# Patient Record
Sex: FEMALE | Race: WHITE | NOT HISPANIC OR LATINO | Employment: PART TIME | URBAN - NONMETROPOLITAN AREA
[De-identification: names, ages, dates, MRNs, and addresses within clinical notes are randomized per-mention and may not be internally consistent; named-entity substitution may affect disease eponyms.]

---
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
## 2024-01-05 DIAGNOSIS — N92.0 MENORRHAGIA WITH REGULAR CYCLE: ICD-10-CM

## 2024-01-09 RX ORDER — DROSPIRENONE AND ETHINYL ESTRADIOL 0.02-3(28)
1 KIT ORAL DAILY
Qty: 84 TABLET | Refills: 2 | Status: SHIPPED | OUTPATIENT
Start: 2024-01-09 | End: 2024-03-25

## 2024-01-09 NOTE — TELEPHONE ENCOUNTER
[facility] sent Rx request for the following:      Requested Prescriptions   Pending Prescriptions Disp Refills    drospirenone-ethinyl estradiol (JUAQUIN) 3-0.02 MG tablet [Pharmacy Med Name: DROSPIRENONE/ETHY EST 3/0.02MG T 28] 84 tablet 3     Sig: TAKE 1 TABLET BY MOUTH DAILY   Last Prescription Date:   2/27/23  Last Fill Qty/Refills:         84, R-3    Last Office Visit:              12/1/23   Future Office visit:           None    Prescription approved per [facility] Refill Protocol.    Shamika Renee RN .............. 1/9/2024  3:03 PM

## 2024-03-23 SDOH — HEALTH STABILITY: PHYSICAL HEALTH: ON AVERAGE, HOW MANY DAYS PER WEEK DO YOU ENGAGE IN MODERATE TO STRENUOUS EXERCISE (LIKE A BRISK WALK)?: 5 DAYS

## 2024-03-23 SDOH — HEALTH STABILITY: PHYSICAL HEALTH: ON AVERAGE, HOW MANY MINUTES DO YOU ENGAGE IN EXERCISE AT THIS LEVEL?: 90 MIN

## 2024-03-23 ASSESSMENT — SOCIAL DETERMINANTS OF HEALTH (SDOH): HOW OFTEN DO YOU GET TOGETHER WITH FRIENDS OR RELATIVES?: MORE THAN THREE TIMES A WEEK

## 2024-03-25 ENCOUNTER — OFFICE VISIT (OUTPATIENT)
Dept: FAMILY MEDICINE | Facility: OTHER | Age: 19
End: 2024-03-25
Attending: FAMILY MEDICINE
Payer: COMMERCIAL

## 2024-03-25 VITALS
DIASTOLIC BLOOD PRESSURE: 62 MMHG | WEIGHT: 173.2 LBS | HEART RATE: 74 BPM | BODY MASS INDEX: 27.83 KG/M2 | TEMPERATURE: 98.2 F | HEIGHT: 66 IN | OXYGEN SATURATION: 100 % | SYSTOLIC BLOOD PRESSURE: 100 MMHG | RESPIRATION RATE: 20 BRPM

## 2024-03-25 DIAGNOSIS — Z02.89 HEALTH EXAMINATION OF DEFINED SUBPOPULATION: Primary | ICD-10-CM

## 2024-03-25 DIAGNOSIS — L70.0 ACNE VULGARIS: ICD-10-CM

## 2024-03-25 DIAGNOSIS — E61.1 IRON DEFICIENCY: ICD-10-CM

## 2024-03-25 DIAGNOSIS — N92.0 MENORRHAGIA WITH REGULAR CYCLE: ICD-10-CM

## 2024-03-25 LAB
C TRACH DNA SPEC QL PROBE+SIG AMP: NEGATIVE
N GONORRHOEA DNA SPEC QL NAA+PROBE: NEGATIVE

## 2024-03-25 PROCEDURE — 90471 IMMUNIZATION ADMIN: CPT | Performed by: FAMILY MEDICINE

## 2024-03-25 PROCEDURE — 90480 ADMN SARSCOV2 VAC 1/ONLY CMP: CPT | Performed by: FAMILY MEDICINE

## 2024-03-25 PROCEDURE — 91320 SARSCV2 VAC 30MCG TRS-SUC IM: CPT | Performed by: FAMILY MEDICINE

## 2024-03-25 PROCEDURE — 99395 PREV VISIT EST AGE 18-39: CPT | Mod: 25 | Performed by: FAMILY MEDICINE

## 2024-03-25 PROCEDURE — 90686 IIV4 VACC NO PRSV 0.5 ML IM: CPT | Performed by: FAMILY MEDICINE

## 2024-03-25 PROCEDURE — 87491 CHLMYD TRACH DNA AMP PROBE: CPT | Mod: ZL | Performed by: FAMILY MEDICINE

## 2024-03-25 RX ORDER — DEXTROAMPHETAMINE SACCHARATE, AMPHETAMINE ASPARTATE MONOHYDRATE, DEXTROAMPHETAMINE SULFATE AND AMPHETAMINE SULFATE 3.75; 3.75; 3.75; 3.75 MG/1; MG/1; MG/1; MG/1
15 CAPSULE, EXTENDED RELEASE ORAL DAILY
COMMUNITY
Start: 2024-02-27 | End: 2024-06-03

## 2024-03-25 RX ORDER — TRETINOIN 0.25 MG/G
GEL TOPICAL AT BEDTIME
Qty: 20 G | Refills: 4 | Status: SHIPPED | OUTPATIENT
Start: 2024-03-25

## 2024-03-25 RX ORDER — DROSPIRENONE AND ETHINYL ESTRADIOL 0.02-3(28)
1 KIT ORAL DAILY
Qty: 84 TABLET | Refills: 3 | Status: SHIPPED | OUTPATIENT
Start: 2024-03-25

## 2024-03-25 ASSESSMENT — PAIN SCALES - GENERAL: PAINLEVEL: NO PAIN (0)

## 2024-03-25 NOTE — PROGRESS NOTES
"  SUBJECTIVE:   Paty Maurer is a 18 year old female who presents to clinic today for the following health issues: Physical establish care    Patient arrives here for physical and establish care.  Patient is requesting chlamydia screening.  She reports no risk factors.  She also requesting a refill of her iron.  Reviewed review of the chart shows that not been in iron deficiency.  This was discussed with the patient.  She also gets her mental health care through Dr. Huang in [facility].          Patient Active Problem List    Diagnosis Date Noted    Menorrhagia with regular cycle 12/03/2023     Priority: Medium    Acne vulgaris 12/03/2023     Priority: Medium    Migraine without aura and with status migrainosus, not intractable 12/03/2023     Priority: Medium    Adjustment disorder with mixed anxiety and depressed mood 08/13/2018     Priority: Medium     Francie Azul recommends a good therapist.  If she needs medications, she should start with the usual ssri's recommended for adolescents.  Signed by Sowmya Hickman MD .....8/31/2020 9:18 AM'      Allergic rhinitis 06/18/2012     Priority: Medium    Dermatitis, atopic 03/21/2006     Priority: Medium     Past Medical History:   Diagnosis Date    Accessory nipple     No Comments Provided    Headache     2009,thought to be secondary to environmental allergies    Otitis media     2005    Otitis media     2005,left    Otitis media of both ears     2005,, treated with a 10-day course of Amoxicillin.    Personal history of other diseases of the female genital tract     Born 39 weeks induced vaginal delivery, birth wt. 7 lbs. ? oz. 20 ? in. long, mother on prednisone during pregnancy due to anti-lupus antibodies present        Review of Systems     OBJECTIVE:     /62   Pulse 74   Temp 98.2  F (36.8  C)   Resp 20   Ht 1.676 m (5' 6\")   Wt 78.6 kg (173 lb 3.2 oz)   LMP 03/09/2024   SpO2 100%   BMI 27.96 kg/m    Body mass index is 27.96 kg/m .  Physical " Exam  Constitutional:       Appearance: Normal appearance.   HENT:      Head: Normocephalic and atraumatic.   Cardiovascular:      Rate and Rhythm: Normal rate and regular rhythm.   Pulmonary:      Effort: Pulmonary effort is normal.      Breath sounds: Normal breath sounds.   Abdominal:      General: Abdomen is flat.   Neurological:      Mental Status: She is alert.   Psychiatric:         Mood and Affect: Mood normal.         Thought Content: Thought content normal.         Diagnostic Test Results:  none     ASSESSMENT/PLAN:         (Z02.89) Health examination of defined subpopulation  (primary encounter diagnosis) Chlamydia GC drawn      (E61.1) Iron deficiency  Comment: History of.  Recent labs were satisfactory including a ferritin.  Will discontinue the iron supplement  Plan:     (N92.0) Menorrhagia with regular cycle  Commen patient reports improvement with he has  Plan: drospirenone-ethinyl estradiol (JUAQUIN) 3-0.02 MG         tablet, GC/Chlamydia by PCR            (L70.0) Acne vulgaris  Comment:   Plan: tretinoin (RETIN-A) 0.025 % external gel        Refill      Fabio Martin MD  [facility]

## 2024-03-25 NOTE — NURSING NOTE
Patient here for physical and STD screening. Last eye exam unsure no concerns with vision and last dental exam March 2024. Medication Reconciliation: complete.    Florina Duval LPN  3/25/2024 11:30 AM

## 2024-04-20 ENCOUNTER — OFFICE VISIT (OUTPATIENT)
Dept: FAMILY MEDICINE | Facility: OTHER | Age: 19
End: 2024-04-20
Attending: NURSE PRACTITIONER
Payer: COMMERCIAL

## 2024-04-20 VITALS
HEIGHT: 65 IN | DIASTOLIC BLOOD PRESSURE: 82 MMHG | OXYGEN SATURATION: 97 % | SYSTOLIC BLOOD PRESSURE: 132 MMHG | WEIGHT: 171.1 LBS | TEMPERATURE: 98 F | BODY MASS INDEX: 28.51 KG/M2 | RESPIRATION RATE: 18 BRPM | HEART RATE: 112 BPM

## 2024-04-20 DIAGNOSIS — R35.0 URINARY FREQUENCY: ICD-10-CM

## 2024-04-20 DIAGNOSIS — G44.52 NEW PERSISTENT DAILY HEADACHE: ICD-10-CM

## 2024-04-20 DIAGNOSIS — R42 DIZZINESS: ICD-10-CM

## 2024-04-20 DIAGNOSIS — R30.0 DYSURIA: Primary | ICD-10-CM

## 2024-04-20 LAB
ALBUMIN UR-MCNC: NEGATIVE MG/DL
APPEARANCE UR: CLEAR
BACTERIA #/AREA URNS HPF: ABNORMAL /HPF
BILIRUB UR QL STRIP: NEGATIVE
COLOR UR AUTO: YELLOW
GLUCOSE UR STRIP-MCNC: NEGATIVE MG/DL
HGB UR QL STRIP: NEGATIVE
KETONES UR STRIP-MCNC: NEGATIVE MG/DL
LEUKOCYTE ESTERASE UR QL STRIP: NEGATIVE
MUCOUS THREADS #/AREA URNS LPF: PRESENT /LPF
NITRATE UR QL: NEGATIVE
PH UR STRIP: 6.5 [PH] (ref 5–9)
RBC URINE: <1 /HPF
SP GR UR STRIP: 1 (ref 1–1.03)
SQUAMOUS EPITHELIAL: 5 /HPF
UROBILINOGEN UR STRIP-MCNC: NORMAL MG/DL
WBC URINE: 10 /HPF

## 2024-04-20 PROCEDURE — 81001 URINALYSIS AUTO W/SCOPE: CPT | Mod: ZL

## 2024-04-20 PROCEDURE — 93000 ELECTROCARDIOGRAM COMPLETE: CPT | Performed by: INTERNAL MEDICINE

## 2024-04-20 PROCEDURE — 99214 OFFICE O/P EST MOD 30 MIN: CPT | Performed by: NURSE PRACTITIONER

## 2024-04-20 PROCEDURE — 87086 URINE CULTURE/COLONY COUNT: CPT | Mod: ZL | Performed by: NURSE PRACTITIONER

## 2024-04-20 PROCEDURE — 96372 THER/PROPH/DIAG INJ SC/IM: CPT

## 2024-04-20 PROCEDURE — 250N000011 HC RX IP 250 OP 636

## 2024-04-20 RX ORDER — ONDANSETRON 4 MG/1
4 TABLET, ORALLY DISINTEGRATING ORAL ONCE
Status: COMPLETED | OUTPATIENT
Start: 2024-04-20 | End: 2024-04-20

## 2024-04-20 RX ORDER — KETOROLAC TROMETHAMINE 30 MG/ML
30 INJECTION, SOLUTION INTRAMUSCULAR; INTRAVENOUS ONCE
Status: COMPLETED | OUTPATIENT
Start: 2024-04-20 | End: 2024-04-20

## 2024-04-20 RX ADMIN — KETOROLAC TROMETHAMINE 30 MG: 30 INJECTION, SOLUTION INTRAMUSCULAR at 14:17

## 2024-04-20 RX ADMIN — ONDANSETRON 4 MG: 4 TABLET, ORALLY DISINTEGRATING ORAL at 14:17

## 2024-04-20 ASSESSMENT — PAIN SCALES - GENERAL: PAINLEVEL: NO PAIN (0)

## 2024-04-20 NOTE — NURSING NOTE
"Chief Complaint   Patient presents with    UTI       Initial /82 (BP Location: Left arm, Patient Position: Chair)   Pulse 112   Temp 98  F (36.7  C) (Temporal)   Resp 18   Ht 1.651 m (5' 5\")   Wt 77.6 kg (171 lb 1.6 oz)   LMP 03/09/2024   SpO2 97%   Breastfeeding No   BMI 28.47 kg/m   Estimated body mass index is 28.47 kg/m  as calculated from the following:    Height as of this encounter: 1.651 m (5' 5\").    Weight as of this encounter: 77.6 kg (171 lb 1.6 oz).    FOOD SECURITY SCREENING QUESTIONS:    The next two questions are to help us understand your food security.  If you are feeling you need any assistance in this area, we have resources available to support you today.    Hunger Vital Signs:  Within the past 12 months we worried whether our food would run out before we got money to buy more. Never  Within the past 12 months the food we bought just didn't last and we didn't have money to get more. Never    Medication Reconciliation: Complete.       Rose Mary Beal LPN on 4/20/2024 at 1:41 PM     "

## 2024-04-20 NOTE — NURSING NOTE
Clinic Administered Medication Documentation        Patient was given Zofran and Toradol. Prior to medication administration, verified patient's identity using patient s name and date of birth. Please see MAR and medication order for additional information. Patient instructed to remain in clinic for 15 minutes and report any adverse reaction to staff immediately.    Vial/Syringe: Single dose vial. Was entire vial of medication used? Yes  Rose Mary Beal LPN...................4/20/2024   2:08 PM

## 2024-04-20 NOTE — PROGRESS NOTES
"ASSESSMENT/PLAN:     I have reviewed the nursing notes.  I have reviewed the findings, diagnosis, plan and need for follow up with the patient.        1. Dysuria    - UA with Microscopic reflex to Culture    Urinalysis -yellow, clear, negative blood, negative nitrite, negative leukocyte esterase  Urine microscopic - RBC <1, WBC 10, bacteria few, mucus present`    Urine culture pending.  No clinical indications for antibiotics at this time.  Patient had negative gonorrhea and Chlamydia testing on 3/25/2024    Patient with self-reported history of frequent UTIs however on review of chart, urine cultures have not grown out bacterial infection.  Unremarkable urinalysis on 7/14/2023.  Unremarkable urinalysis on 6/16/2023.  Urine culture from 6/8/2023 with mixed urogenital cait.  Urine culture from 3/28/2023 with mixed urogenital cait.  Urine culture from 3/27/2023 with mixed urogenital cait.  Urine culture from 3/3/2023 with no growth.    Encourage fluids and regular bladder emptying  May use over-the-counter Pyridium as needed    Patient reports she has an appointment upcoming next month with urology for further evaluation and management    2. Urinary frequency    - UA with Microscopic reflex to Culture    Patient with history of chronic urinary frequency without infection noted on urine cultures over the past year.    Patient reports she has an appointment upcoming next month with urology for further evaluation and management    3. Dizziness    - Orthostatic blood pressure; Standing  - Orthostatic blood pressure  - EKG 12-lead, tracing only (Same Day)    Patient with acute onset dizziness since last night.  Orthostatic blood pressures were abnormal today:  Laying: /86  P 91  Sitting:  /79  P 101  Standing for 1 minute:  /76  P 64  Standing for 5 minutes:  /86  P 112    EKG completed and reviewed with preliminary result:  \"Sinus rhythm with sinus arrhythmia.  Normal ECG.  When compared to ECG " "from 10/22/2021 incomplete right bundle branch block is no longer present T wave inversion no longer evident in anterior leads.\"    Recommend follow-up with primary care for further evaluation and management.  Patient has appointment scheduled for 5/2/2024    4. New persistent daily headache    - ketorolac (TORADOL) injection 30 mg IM injection x 1 administered in clinic  - ondansetron (ZOFRAN ODT) ODT tab 4 mg x 1 administered in clinic    Patient with new onset of daily headaches for the past 2 weeks pain localized on the left parietal area.  No diagnosed history of migraines.  Patient reports no improvement in symptoms with use of ibuprofen.  Patient reports resolution of headache after administration of Toradol and Zofran in clinic today and reports this is the best she has felt in the past 2 weeks.    Recommend follow-up with primary care for further evaluation and management of headaches.   Patient has appointment scheduled for 5/2/2024            I explained my diagnostic considerations and recommendations to the patient, who voiced understanding and agreement with the treatment plan. All questions were answered. We discussed potential side effects of any prescribed or recommended therapies, as well as expectations for response to treatments.    Shara Hastings NP  [facility] AND [facility]      SUBJECTIVE:   Paty Maurer is a 19 year old female who presents to clinic today for the following health issues:  UTI and dizziness       HPI  Chronic frequent urination.  Dysuria, abdominal cramping, back pain, nausea and dizziness since last night.  No noted cloudy appearance.  No hematuria.  No noted odor.  No fevers, chills, or sweats.  No vomiting.  Mild constipation from iron supplements.  Appetite at baseline.  Drinking fluids fair.  States hx of frequent UTIs.  Reports she is seeing urology next month for chronic urinary frequency and frequent UTIs.  Frequent headaches the past 2 weeks.  Localized " headache pain on left side of head.  Mild blurry vision.  No LOC or head injury.  No light or noise sensitivity.  No neck pain or stiffness.          Past Medical History:   Diagnosis Date    Accessory nipple     No Comments Provided    Headache     2009,thought to be secondary to environmental allergies    Otitis media     2005    Otitis media     2005,left    Otitis media of both ears     2005,, treated with a 10-day course of Amoxicillin.    Personal history of other diseases of the female genital tract     Born 39 weeks induced vaginal delivery, birth wt. 7 lbs. ? oz. 20 ? in. long, mother on prednisone during pregnancy due to anti-lupus antibodies present     Past Surgical History:   Procedure Laterality Date    OTHER SURGICAL HISTORY      19354.0,PAST SURGICAL HISTORY,Born 39 weeks induced vaginal delivery, birth wt. 7 lbs. ? oz. 20 ? in. long, mother on prednisone during pregnancy due to anti-lupus antibodies present ~Supernumerary nipple.  ~2/07/05 Bilateral otitis media, treated with a 10-day course of Amoxicillin.*     Social History     Tobacco Use    Smoking status: Passive Smoke Exposure - Never Smoker    Smokeless tobacco: Never    Tobacco comments:     mom smokes outside   Substance Use Topics    Alcohol use: Never     Alcohol/week: 0.0 standard drinks of alcohol     Current Outpatient Medications   Medication Sig Dispense Refill    amphetamine-dextroamphetamine (ADDERALL XR) 15 MG 24 hr capsule Take 15 mg by mouth daily      drospirenone-ethinyl estradiol (JUAQUIN) 3-0.02 MG tablet Take 1 tablet by mouth daily 84 tablet 3    hydrOXYzine (ATARAX) 10 MG tablet Take 10 mg by mouth every 4 hours as needed      lamoTRIgine (LAMICTAL) 25 MG 24 hr tablet Take 25 mg by mouth daily 06/08/2023 Take 3 tablets once daily  rh/lpn      tretinoin (RETIN-A) 0.025 % external gel Apply topically at bedtime 20 g 4     No Known Allergies      Past medical history, past surgical history, current medications and allergies  "reviewed and accurate to the best of my knowledge.        OBJECTIVE:     /82 (BP Location: Left arm, Patient Position: Chair)   Pulse 112   Temp 98  F (36.7  C) (Temporal)   Resp 18   Ht 1.651 m (5' 5\")   Wt 77.6 kg (171 lb 1.6 oz)   LMP 03/09/2024   SpO2 97%   Breastfeeding No   BMI 28.47 kg/m    Body mass index is 28.47 kg/m .        Physical Exam  General Appearance: Well appearing adolescent female, non-ill appearance, appropriate appearance for age. No acute distress  Eyes: conjunctivae normal without erythema or irritation, corneas clear, no drainage or crusting, no eyelid swelling, pupils equal and reactive to light and accomodation.    Orophayrnx: moist mucous membranes, pharynx without erythema, tonsils without hypertrophy, tonsils without erythema, no tonsillar exudates, no oral lesions, no palate petechiae, no post nasal drip seen, no trismus, voice clear.    Sinuses:  No sinus tenderness upon palpation of the frontal or maxillary sinuses  Nose:  No noted drainage or congestion   Neck: supple without adenopathy  Respiratory: normal chest wall and respirations.  Normal effort.  Clear to auscultation bilaterally, no wheezing, crackles or rhonchi.  No increased work of breathing.  No cough appreciated.  Cardiac: RRR with no murmurs  Orthostatic blood pressures:  Laying: /86  P 91  Sitting:  /79  P 101  Standing for 1 minute:  /76  P 64  Standing for 5 minutes:  /86  P 112  Abdomen: soft, nontender, no rigidity, no rebound tenderness or guarding, normal bowel sounds present  :  No suprapubic tenderness to palpation.  No flank tenderness.    Musculoskeletal:  Equal movement of bilateral upper extremities.  Equal movement of bilateral lower extremities.  Lower lumbar paraspinal tenderness.  Normal gait.    Neurological: [place] score of 15.  Cranial nerves grossly tested and intact without deficit.  No gross muscle wasting and can ambulate and get onto exam table " unassisted. Sensation to light touch intact.  No deficit with nose to finger rapid alternating movement. Pt able to walk heel to toe. Bilaterally can touch heel to shin.  No pronator drift. Speech clear.  Dermatological: no rashes noted of exposed skin  Psychological: normal affect, alert, oriented, and pleasant.       Labs:  Results for orders placed or performed in visit on 04/20/24   UA with Microscopic reflex to Culture     Status: Abnormal    Specimen: Urine, Midstream   Result Value Ref Range    Color Urine Yellow Colorless, Straw, Light Yellow, Yellow    Appearance Urine Clear Clear    Glucose Urine Negative Negative mg/dL    Bilirubin Urine Negative Negative    Ketones Urine Negative Negative mg/dL    Specific Gravity Urine 1.003 1.000 - 1.030    Blood Urine Negative Negative    pH Urine 6.5 5.0 - 9.0    Protein Albumin Urine Negative Negative mg/dL    Urobilinogen Urine Normal Normal, 2.0 mg/dL    Nitrite Urine Negative Negative    Leukocyte Esterase Urine Negative Negative    Bacteria Urine Few (A) None Seen /HPF    Mucus Urine Present (A) None Seen /LPF    RBC Urine <1 <=2 /HPF    WBC Urine 10 (H) <=5 /HPF    Squamous Epithelials Urine 5 (H) <=1 /HPF    Narrative    Urine Culture not indicated

## 2024-04-22 LAB — BACTERIA UR CULT: NORMAL

## 2024-04-23 LAB
ATRIAL RATE - MUSE: 73 BPM
DIASTOLIC BLOOD PRESSURE - MUSE: NORMAL MMHG
INTERPRETATION ECG - MUSE: NORMAL
P AXIS - MUSE: 26 DEGREES
PR INTERVAL - MUSE: 136 MS
QRS DURATION - MUSE: 90 MS
QT - MUSE: 386 MS
QTC - MUSE: 425 MS
R AXIS - MUSE: 68 DEGREES
SYSTOLIC BLOOD PRESSURE - MUSE: NORMAL MMHG
T AXIS - MUSE: 54 DEGREES
VENTRICULAR RATE- MUSE: 73 BPM

## 2024-06-03 ENCOUNTER — OFFICE VISIT (OUTPATIENT)
Dept: FAMILY MEDICINE | Facility: OTHER | Age: 19
End: 2024-06-03
Attending: FAMILY MEDICINE
Payer: COMMERCIAL

## 2024-06-03 VITALS
DIASTOLIC BLOOD PRESSURE: 68 MMHG | WEIGHT: 169 LBS | TEMPERATURE: 98.8 F | SYSTOLIC BLOOD PRESSURE: 126 MMHG | HEART RATE: 88 BPM | RESPIRATION RATE: 20 BRPM | BODY MASS INDEX: 28.12 KG/M2 | OXYGEN SATURATION: 98 %

## 2024-06-03 DIAGNOSIS — F90.9 ATTENTION DEFICIT HYPERACTIVITY DISORDER (ADHD), UNSPECIFIED ADHD TYPE: Primary | ICD-10-CM

## 2024-06-03 PROCEDURE — 99213 OFFICE O/P EST LOW 20 MIN: CPT | Performed by: FAMILY MEDICINE

## 2024-06-03 RX ORDER — DEXTROAMPHETAMINE SACCHARATE, AMPHETAMINE ASPARTATE MONOHYDRATE, DEXTROAMPHETAMINE SULFATE AND AMPHETAMINE SULFATE 5; 5; 5; 5 MG/1; MG/1; MG/1; MG/1
20 CAPSULE, EXTENDED RELEASE ORAL EVERY MORNING
COMMUNITY
Start: 2024-04-29 | End: 2024-06-03

## 2024-06-03 RX ORDER — DEXTROAMPHETAMINE SACCHARATE, AMPHETAMINE ASPARTATE MONOHYDRATE, DEXTROAMPHETAMINE SULFATE AND AMPHETAMINE SULFATE 5; 5; 5; 5 MG/1; MG/1; MG/1; MG/1
20 CAPSULE, EXTENDED RELEASE ORAL EVERY MORNING
Qty: 20 CAPSULE | Refills: 0 | Status: SHIPPED | OUTPATIENT
Start: 2024-06-03

## 2024-06-03 ASSESSMENT — PAIN SCALES - GENERAL: PAINLEVEL: NO PAIN (0)

## 2024-06-03 NOTE — NURSING NOTE
Patient here for refill on her adderall her physchiatrist is out of the office. 06/19 Next appointment with . Medication Reconciliation: complete.    Florina Duval LPN  6/3/2024 3:03 PM

## 2024-06-04 NOTE — PROGRESS NOTES
SUBJECTIVE:   Paty Maurer is a 19 year old female who presents to clinic today for the following health issues: Medication renewal    Patient arrives here for Adderall renewal.  She states that she missed her last psychiatric appointment.  In will run out before she can get into her psychiatrist.  She is scheduled to see him in 19 days.  Received a telephone note indicating that no other individuals providers at [facility] would not  refill it without being seen    History of Present Illness       Reason for visit:  To get A prescription filled due to my regular psychiatrist being out of office    She eats 2-3 servings of fruits and vegetables daily.She consumes 0 sweetened beverage(s) daily.She exercises with enough effort to increase her heart rate 30 to 60 minutes per day.  She exercises with enough effort to increase her heart rate 6 days per week.   She is taking medications regularly.        Patient Active Problem List    Diagnosis Date Noted    Menorrhagia with regular cycle 12/03/2023     Priority: Medium    Acne vulgaris 12/03/2023     Priority: Medium    Migraine without aura and with status migrainosus, not intractable 12/03/2023     Priority: Medium    Adjustment disorder with mixed anxiety and depressed mood 08/13/2018     Priority: Medium     Francie Azul recommends a good therapist.  If she needs medications, she should start with the usual ssri's recommended for adolescents.  Signed by Sowmya Hickman MD .....8/31/2020 9:18 AM'      Allergic rhinitis 06/18/2012     Priority: Medium    Dermatitis, atopic 03/21/2006     Priority: Medium     Past Medical History:   Diagnosis Date    Accessory nipple     No Comments Provided    Headache     2009,thought to be secondary to environmental allergies    Otitis media     2005    Otitis media     2005,left    Otitis media of both ears     2005,, treated with a 10-day course of Amoxicillin.    Personal history of other diseases of the female genital tract      Born 39 weeks induced vaginal delivery, birth wt. 7 lbs. ? oz. 20 ? in. long, mother on prednisone during pregnancy due to anti-lupus antibodies present        Review of Systems     OBJECTIVE:     /68   Pulse 88   Temp 98.8  F (37.1  C)   Resp 20   Wt 76.7 kg (169 lb)   LMP 05/25/2024   SpO2 98%   BMI 28.12 kg/m    Body mass index is 28.12 kg/m .  Physical Exam  HENT:      Head: Normocephalic.   Pulmonary:      Effort: Pulmonary effort is normal.   Neurological:      Mental Status: She is alert.   Psychiatric:         Mood and Affect: Mood normal.         Thought Content: Thought content normal.         Diagnostic Test Results:  none     ASSESSMENT/PLAN:         (F90.9) Attention deficit hyperactivity disorder (ADHD), unspecified ADHD type  (primary encounter diagnosis)  Comment:   Plan: amphetamine-dextroamphetamine (ADDERALL XR) 20         MG 24 hr capsule        Medication refilled for 20 days.  Follow-up with psych      Fabio Martin MD  [facility] AND \[facility]\""

## 2024-10-05 ENCOUNTER — HOSPITAL ENCOUNTER (EMERGENCY)
Facility: OTHER | Age: 19
Discharge: HOME | End: 2024-10-05
Attending: FAMILY MEDICINE | Admitting: FAMILY MEDICINE
Payer: COMMERCIAL

## 2024-10-05 VITALS
OXYGEN SATURATION: 99 % | DIASTOLIC BLOOD PRESSURE: 78 MMHG | BODY MASS INDEX: 26.66 KG/M2 | SYSTOLIC BLOOD PRESSURE: 113 MMHG | HEART RATE: 85 BPM | TEMPERATURE: 97.8 F | HEIGHT: 65 IN | RESPIRATION RATE: 18 BRPM | WEIGHT: 160 LBS

## 2024-10-05 DIAGNOSIS — B30.9 ACUTE VIRAL CONJUNCTIVITIS OF LEFT EYE: Primary | ICD-10-CM

## 2024-10-05 PROCEDURE — 99283 EMERGENCY DEPT VISIT LOW MDM: CPT | Performed by: FAMILY MEDICINE

## 2024-10-05 PROCEDURE — 250N000009 HC RX 250: Performed by: FAMILY MEDICINE

## 2024-10-05 RX ORDER — POLYMYXIN B SULFATE AND TRIMETHOPRIM 1; 10000 MG/ML; [USP'U]/ML
1-2 SOLUTION OPHTHALMIC 4 TIMES DAILY
Qty: 10 ML | Refills: 0 | Status: SHIPPED | OUTPATIENT
Start: 2024-10-05 | End: 2024-10-12

## 2024-10-05 RX ADMIN — FLUORESCEIN SODIUM 1 STRIP: 1 STRIP OPHTHALMIC at 04:47

## 2024-10-05 ASSESSMENT — ENCOUNTER SYMPTOMS
COUGH: 0
DIZZINESS: 0
SHORTNESS OF BREATH: 0
ACTIVITY CHANGE: 0
DECREASED CONCENTRATION: 0
ARTHRALGIAS: 0
RHINORRHEA: 0
FEVER: 0
LIGHT-HEADEDNESS: 0
EYE PAIN: 1
CONFUSION: 0
NAUSEA: 0
ABDOMINAL PAIN: 0
EYE REDNESS: 1
MYALGIAS: 0
EYE DISCHARGE: 1
CHILLS: 0
EYE ITCHING: 1
WEAKNESS: 0

## 2024-10-05 ASSESSMENT — VISUAL ACUITY
OD: 20/25
OS: 20/30

## 2024-10-05 ASSESSMENT — ACTIVITIES OF DAILY LIVING (ADL): ADLS_ACUITY_SCORE: 35

## 2024-10-05 NOTE — DISCHARGE INSTRUCTIONS
Use the eye drops 4 times daily for 7 days or until 2 days after clear.  No work for 24 hours.  Wash hands and linen frequently to decrease the risk of transmission to others.  Follow-up as needed for worsening or persisting symptoms.

## 2024-10-05 NOTE — ED PROVIDER NOTES
History     Chief Complaint   Patient presents with    Eye Problem     HPI  Paty Maurer is a 19 year old female who presented to the ER with complaints of redness and blurred vision in her left eye.  Noted some redness of the eye yesterday and thought she may have pink eye as she runs a  and noted a child with red eyes last week.  However she awoke during the night and noted blurred vision in her left eye which she has not had with conjunctivitis previously.  Had dry skin around her eyes 2 weeks ago but this improved.  Denies recent URI sx.    Allergies:  No Known Allergies    Problem List:    Patient Active Problem List    Diagnosis Date Noted    Menorrhagia with regular cycle 12/03/2023     Priority: Medium    Acne vulgaris 12/03/2023     Priority: Medium    Migraine without aura and with status migrainosus, not intractable 12/03/2023     Priority: Medium    Adjustment disorder with mixed anxiety and depressed mood 08/13/2018     Priority: Medium     Francie Azul recommends a good therapist.  If she needs medications, she should start with the usual ssri's recommended for adolescents.  Signed by Sowmya Hickman MD .....8/31/2020 9:18 AM'      Allergic rhinitis 06/18/2012     Priority: Medium    Dermatitis, atopic 03/21/2006     Priority: Medium        Past Medical History:    Past Medical History:   Diagnosis Date    Accessory nipple     Headache     Otitis media     Otitis media     Otitis media of both ears     Personal history of other diseases of the female genital tract        Past Surgical History:    Past Surgical History:   Procedure Laterality Date    OTHER SURGICAL HISTORY      08874.0,PAST SURGICAL HISTORY,Born 39 weeks induced vaginal delivery, birth wt. 7 lbs. ? oz. 20 ? in. long, mother on prednisone during pregnancy due to anti-lupus antibodies present ~Supernumerary nipple.  ~2/07/05 Bilateral otitis media, treated with a 10-day course of Amoxicillin.*       Family History:    Family  History   Problem Relation Age of Onset    Other - See Comments Mother         Two SAB and cystic hygroma/Allergy to bee stings.    Raynaud syndrome Mother     Heart Disease Father     Other - See Comments Brother         autism    Other - See Comments Maternal Grandfather         Allergic to bee stings.    Asthma Maternal Aunt         Asthma    Other - See Comments Maternal Aunt          Recurrent urinary tract infections    Raynaud syndrome Maternal Aunt     Other - See Comments Other         Grandmother  Allergic to seafood.  Brain aneurysm.    Other - See Comments Other         Great-GMBrain aneurysm       Social History:  Marital Status:  Single [1]  Social History     Tobacco Use    Smoking status: Passive Smoke Exposure - Never Smoker    Smokeless tobacco: Never    Tobacco comments:     mom smokes outside   Vaping Use    Vaping status: Every Day    Substances: Nicotine    Devices: Disposable   Substance Use Topics    Alcohol use: Never     Alcohol/week: 0.0 standard drinks of alcohol    Drug use: Never        Medications:    polymixin b-trimethoprim (POLYTRIM) 27605-6.1 UNIT/ML-% ophthalmic solution  amphetamine-dextroamphetamine (ADDERALL XR) 20 MG 24 hr capsule  drospirenone-ethinyl estradiol (JUAQUIN) 3-0.02 MG tablet  hydrOXYzine (ATARAX) 10 MG tablet  lamoTRIgine (LAMICTAL) 25 MG 24 hr tablet  tretinoin (RETIN-A) 0.025 % external gel          Review of Systems   Constitutional:  Negative for activity change, chills and fever.   HENT:  Negative for congestion, ear pain and rhinorrhea.    Eyes:  Positive for pain, discharge, redness, itching and visual disturbance.   Respiratory:  Negative for cough and shortness of breath.    Cardiovascular:  Negative for chest pain.   Gastrointestinal:  Negative for abdominal pain and nausea.   Musculoskeletal:  Negative for arthralgias and myalgias.   Skin:  Positive for rash.        Dry skin around eyes.   Allergic/Immunologic: Negative for environmental allergies.  "  Neurological:  Negative for dizziness, weakness and light-headedness.   Psychiatric/Behavioral:  Negative for confusion and decreased concentration.        Physical Exam   BP: 113/78  Pulse: 85  Temp: 97.8  F (36.6  C)  Resp: 18  Height: 165.1 cm (5' 5\")  Weight: 72.6 kg (160 lb)  SpO2: 99 %      Physical Exam  Vitals and nursing note reviewed.   Constitutional:       General: She is not in acute distress.     Appearance: She is not ill-appearing.   HENT:      Head: Normocephalic and atraumatic.      Nose: No congestion or rhinorrhea.      Mouth/Throat:      Mouth: Mucous membranes are moist.   Eyes:      General:         Right eye: No discharge.         Left eye: No discharge.      Extraocular Movements: Extraocular movements intact.      Pupils: Pupils are equal, round, and reactive to light.      Comments: Dry skin around eyes bilaterally but no significant blepharitis.  Mild conjunctival injection on the left.  No abnormalities with ophthalmoscopic exam or with fluorescein staining.   Cardiovascular:      Rate and Rhythm: Normal rate and regular rhythm.   Pulmonary:      Effort: Pulmonary effort is normal. No respiratory distress.   Abdominal:      General: Abdomen is flat.   Musculoskeletal:      Cervical back: Normal range of motion.      Right lower leg: No edema.      Left lower leg: No edema.   Skin:     General: Skin is warm and dry.   Neurological:      General: No focal deficit present.      Mental Status: She is alert and oriented to person, place, and time.      Cranial Nerves: No cranial nerve deficit.   Psychiatric:         Mood and Affect: Mood normal.         Behavior: Behavior normal.         ED Course        Procedures              Critical Care time:  none              No results found for this or any previous visit (from the past 24 hour(s)).    Medications   fluorescein (FUL-OLIVER) ophthalmic strip 1 strip (1 strip Left Eye $Given 10/5/24 9951)       Assessments & Plan (with Medical Decision " Making)     I have reviewed the nursing notes.    I have reviewed the findings, diagnosis, plan and need for follow up with the patient.           Medical Decision Making  The patient's presentation was of low complexity (an acute and uncomplicated illness or injury).    The patient's evaluation involved:  history and exam without other MDM data elements    The patient's management necessitated moderate risk (prescription drug management including medications given in the ED).        Discharge Medication List as of 10/5/2024  4:39 AM        START taking these medications    Details   polymixin b-trimethoprim (POLYTRIM) 42043-7.1 UNIT/ML-% ophthalmic solution Place 1-2 drops Into the left eye 4 times daily for 7 days., Disp-10 mL, R-0, [facility]             Final diagnoses:   Acute viral conjunctivitis of left eye       10/5/2024   [facility]       Louise Duncan MD  10/05/24 2324

## 2024-10-05 NOTE — ED TRIAGE NOTES
"Pt presents for concern of blurred vision in the left eye. States to about 2 weeks ago noted redness and dry/peeling skin around the left eye, which ultimately was around both eyes but started on the left side. Yesterday noted redness in the left eye, then woke during the night tonight noting blurred vision in the left eye. Denies trauma.    /78   Pulse 85   Temp 97.8  F (36.6  C) (Tympanic)   Resp 18   Ht 1.651 m (5' 5\")   Wt 72.6 kg (160 lb)   SpO2 99%   BMI 26.63 kg/m         Triage Assessment (Adult)       Row Name 10/05/24 0318          Triage Assessment    Airway WDL WDL        Respiratory WDL    Respiratory WDL WDL        Skin Circulation/Temperature WDL    Skin Circulation/Temperature WDL WDL        Cardiac WDL    Cardiac WDL WDL        Peripheral/Neurovascular WDL    Peripheral Neurovascular WDL WDL        Cognitive/Neuro/Behavioral WDL    Cognitive/Neuro/Behavioral WDL WDL                     "

## 2024-10-11 ENCOUNTER — OFFICE VISIT (OUTPATIENT)
Dept: FAMILY MEDICINE | Facility: OTHER | Age: 19
End: 2024-10-11
Attending: NURSE PRACTITIONER
Payer: COMMERCIAL

## 2024-10-11 ENCOUNTER — TELEPHONE (OUTPATIENT)
Dept: FAMILY MEDICINE | Facility: OTHER | Age: 19
End: 2024-10-11

## 2024-10-11 VITALS
BODY MASS INDEX: 27.79 KG/M2 | HEART RATE: 98 BPM | TEMPERATURE: 98.5 F | RESPIRATION RATE: 24 BRPM | DIASTOLIC BLOOD PRESSURE: 88 MMHG | OXYGEN SATURATION: 100 % | WEIGHT: 166.8 LBS | SYSTOLIC BLOOD PRESSURE: 125 MMHG | HEIGHT: 65 IN

## 2024-10-11 DIAGNOSIS — L30.9 DERMATITIS: Primary | ICD-10-CM

## 2024-10-11 PROCEDURE — 99213 OFFICE O/P EST LOW 20 MIN: CPT

## 2024-10-11 RX ORDER — PREDNISONE 20 MG/1
TABLET ORAL
Qty: 18 TABLET | Refills: 0 | Status: SHIPPED | OUTPATIENT
Start: 2024-10-11 | End: 2024-10-27

## 2024-10-11 ASSESSMENT — PAIN SCALES - GENERAL: PAINLEVEL: MODERATE PAIN (4)

## 2024-10-11 NOTE — NURSING NOTE
"Chief Complaint   Patient presents with    Derm Problem    Eye Problem     Patient presents to the [facility] for painful eye and skin irritation causing pain and blurry vision. Patient states she had peeling start in the corner of her eyes a month ago accompanied by a rash on her chest. Patient states it worsened as her eyeballs turned red and she was seen in the ER. She was treated for pink eye and given Polymixin b-trimethoprim.      Initial /88 (BP Location: Right arm, Patient Position: Sitting, Cuff Size: Adult Regular)   Pulse 98   Temp 98.5  F (36.9  C) (Temporal)   Resp 24   Ht 1.651 m (5' 5\")   Wt 75.7 kg (166 lb 12.8 oz)   LMP 09/11/2024 (Approximate)   SpO2 100%   BMI 27.76 kg/m   Estimated body mass index is 27.76 kg/m  as calculated from the following:    Height as of this encounter: 1.651 m (5' 5\").    Weight as of this encounter: 75.7 kg (166 lb 12.8 oz).     FOOD SECURITY SCREENING QUESTIONS:    The next two questions are to help us understand your food security.  If you are feeling you need any assistance in this area, we have resources available to support you today.    Hunger Vital Signs:  Within the past 12 months we worried whether our food would run out before we got money to buy more. Never  Within the past 12 months the food we bought just didn't last and we didn't have money to get more. Never      Charlene Naqvi     "

## 2024-10-11 NOTE — TELEPHONE ENCOUNTER
Patient was seen by [facility] today. A prescription for prednisone was to be sent to [facility]. Per patient, [facility] has not yet received the prescription.  Please advise.  Mima Thorpe on 10/11/2024 at 11:49 AM

## 2024-10-11 NOTE — PATIENT INSTRUCTIONS
Follow up with dermatology professionals. [phone]     Take prednisone as ordered: Take 2 tablets (40 mg) by mouth daily for 5 days, THEN 1 tablet (20 mg) daily for 5 days, THEN 0.5 tablets (10 mg) daily for 6 days.,     Take with food. Take earlier in the day to avoid side effects at bedtime.     For eye concerns follow [facility] with [facility]: [phone]

## 2024-10-11 NOTE — PROGRESS NOTES
ASSESSMENT/PLAN:    (L30.9) Dermatitis  (primary encounter diagnosis)  Comment: Patient notes that she has had a rash to her face and chest with some peeling around her eyelids for over 1 month.  She developed redness to her conjunctiva was treated for bacterial conjunctivitis last week which has helped her improve somewhat, but the rash persists.  She notes she has had some vision changes intermittently.  On exam she did have some periorbital edema and maculopapular rash throughout chest, face, and surrounding eyelids.  No erythema noted.  With the extent of facial involvement I do recommend prednisone taper today.  Follow-up with dermatology ongoing recommended.  I did give her the number to local eye doctor if vision problems return.  Return to care parameters were reviewed with patient and she verbalizes understanding.  Plan: predniSONE (DELTASONE) 20 MG tablet, Adult         Dermatology  Referral  Follow up with dermatology professionals. [phone]     Take prednisone as ordered: Take 2 tablets (40 mg) by mouth daily for 5 days, THEN 1 tablet (20 mg) daily for 5 days, THEN 0.5 tablets (10 mg) daily for 6 days.,     Take with food. Take earlier in the day to avoid side effects at bedtime.     For eye concerns follow up with [facility]: [phone]     I have reviewed the nursing notes.  I have reviewed the findings, diagnosis, plan and need for follow up with the patient.    Discussed warning signs/symptoms indicative of need to f/u    Follow up if symptoms persist or worsen or concerns    I explained my diagnostic considerations and recommendations to the patient, who voiced understanding and agreement with the treatment plan. All questions were answered. We discussed potential side effects of any prescribed or recommended therapies, as well as expectations for response to treatments.    JOHNY LACKEY, JESSY CNP  10/11/2024  10:59 AM    HPI:    Paty Maurer is a 19 year old female  who  presents to [facility] today for concerns of eye irritation.    Patient notes that she has had a rash to her face and chest with some peeling around her eyelids for over 1 month.  She developed redness to her conjunctiva was treated for bacterial conjunctivitis last week which has helped her improve somewhat.  She notes that facial swelling, rash, and pruritus persist.  She has had some blurred vision, she notes that they did an ophthalmologic exam in the ER which returned normal.  She does not have a normal eye doctor.    Past Medical History:   Diagnosis Date    Accessory nipple     No Comments Provided    Headache     2009,thought to be secondary to environmental allergies    Otitis media     2005    Otitis media     2005,left    Otitis media of both ears     2005,, treated with a 10-day course of Amoxicillin.    Personal history of other diseases of the female genital tract     Born 39 weeks induced vaginal delivery, birth wt. 7 lbs. ? oz. 20 ? in. long, mother on prednisone during pregnancy due to anti-lupus antibodies present     Past Surgical History:   Procedure Laterality Date    OTHER SURGICAL HISTORY      47665.0,PAST SURGICAL HISTORY,Born 39 weeks induced vaginal delivery, birth wt. 7 lbs. ? oz. 20 ? in. long, mother on prednisone during pregnancy due to anti-lupus antibodies present ~Supernumerary nipple.  ~2/07/05 Bilateral otitis media, treated with a 10-day course of Amoxicillin.*     Social History     Tobacco Use    Smoking status: Passive Smoke Exposure - Never Smoker    Smokeless tobacco: Never    Tobacco comments:     mom smokes outside   Substance Use Topics    Alcohol use: Never     Alcohol/week: 0.0 standard drinks of alcohol     Current Outpatient Medications   Medication Sig Dispense Refill    amphetamine-dextroamphetamine (ADDERALL XR) 20 MG 24 hr capsule Take 1 capsule (20 mg) by mouth every morning 20 capsule 0    drospirenone-ethinyl estradiol (JUAQUIN) 3-0.02 MG tablet Take 1 tablet by  "mouth daily 84 tablet 3    hydrOXYzine (ATARAX) 10 MG tablet Take 10 mg by mouth every 4 hours as needed      lamoTRIgine (LAMICTAL) 25 MG 24 hr tablet Take 25 mg by mouth daily 06/08/2023 Take 3 tablets once daily  rh/lpn      polymixin b-trimethoprim (POLYTRIM) 50502-1.1 UNIT/ML-% ophthalmic solution Place 1-2 drops Into the left eye 4 times daily for 7 days. 10 mL 0    tretinoin (RETIN-A) 0.025 % external gel Apply topically at bedtime (Patient not taking: Reported on 10/11/2024) 20 g 4     No Known Allergies  Past medical history, past surgical history, current medications and allergies reviewed and accurate to the best of my knowledge.      ROS:  Refer to HPI    /88 (BP Location: Right arm, Patient Position: Sitting, Cuff Size: Adult Regular)   Pulse 98   Temp 98.5  F (36.9  C) (Temporal)   Resp 24   Ht 1.651 m (5' 5\")   Wt 75.7 kg (166 lb 12.8 oz)   LMP 09/11/2024 (Approximate)   SpO2 100%   BMI 27.76 kg/m      EXAM:  General Appearance: Well appearing 19 year old female, appropriate appearance for age. No acute distress   Eyes: conjunctivae normal without erythema or irritation, corneas clear, no drainage or crusting, periorbital edema noted, PERRLA  Oropharynx: moist mucous membranes, posterior pharynx without erythema,  no exudates or petechiae, no post nasal drip seen, no trismus, voice clear.    Sinuses:  No sinus tenderness upon palpation of the frontal or maxillary sinuses  Nose:  Bilateral nares: no erythema, no edema, no drainage or congestion   Neck: supple without adenopathy  Respiratory: normal chest wall and respirations.  Normal effort.  Clear to auscultation bilaterally, no wheezing, crackles or rhonchi.  No increased work of breathing.  No cough appreciated.  Cardiac: RRR with no murmurs  Musculoskeletal:  Equal movement of bilateral upper extremities.  Equal movement of bilateral lower extremities.  Normal gait.    Dermatological: Maculopapular rash throughout chest, face, and " surrounding eyelids.  Neuro: Alert and oriented to person, place, and time.  Cranial nerves II-XII grossly intact with no focal or lateralizing deficits.  Muscle tone normal.  Gait normal. No tremor.   Psychological: normal affect, alert, oriented, and pleasant.

## 2024-10-14 NOTE — TELEPHONE ENCOUNTER
Spoke to pharmacy and they stated that patient has already picked up the prednisone - it was just not ready at the time the pt made this phone call.    Shamika Naqvi on 10/14/2024 at 11:24 AM

## 2024-10-22 ENCOUNTER — OFFICE VISIT (OUTPATIENT)
Dept: FAMILY MEDICINE | Facility: OTHER | Age: 19
End: 2024-10-22
Attending: STUDENT IN AN ORGANIZED HEALTH CARE EDUCATION/TRAINING PROGRAM
Payer: COMMERCIAL

## 2024-10-22 VITALS
HEIGHT: 65 IN | TEMPERATURE: 98.2 F | BODY MASS INDEX: 27.82 KG/M2 | RESPIRATION RATE: 19 BRPM | SYSTOLIC BLOOD PRESSURE: 120 MMHG | HEART RATE: 96 BPM | DIASTOLIC BLOOD PRESSURE: 67 MMHG | WEIGHT: 167 LBS | OXYGEN SATURATION: 100 %

## 2024-10-22 DIAGNOSIS — H02.846 PAIN AND SWELLING OF EYELIDS OF BOTH EYES: Primary | ICD-10-CM

## 2024-10-22 DIAGNOSIS — H02.843 PAIN AND SWELLING OF EYELIDS OF BOTH EYES: Primary | ICD-10-CM

## 2024-10-22 DIAGNOSIS — H02.89 PAIN AND SWELLING OF EYELIDS OF BOTH EYES: Primary | ICD-10-CM

## 2024-10-22 PROCEDURE — 99213 OFFICE O/P EST LOW 20 MIN: CPT | Performed by: STUDENT IN AN ORGANIZED HEALTH CARE EDUCATION/TRAINING PROGRAM

## 2024-10-22 RX ORDER — HYDROCORTISONE 25 MG/G
CREAM TOPICAL 2 TIMES DAILY
Qty: 30 G | Refills: 0 | Status: SHIPPED | OUTPATIENT
Start: 2024-10-22 | End: 2024-10-29

## 2024-10-22 RX ORDER — PREDNISONE 20 MG/1
TABLET ORAL
Qty: 12 TABLET | Refills: 0 | Status: SHIPPED | OUTPATIENT
Start: 2024-10-22 | End: 2024-10-30

## 2024-10-22 RX ORDER — CETIRIZINE HYDROCHLORIDE 10 MG/1
10 TABLET ORAL DAILY
Qty: 30 TABLET | Refills: 0 | Status: SHIPPED | OUTPATIENT
Start: 2024-10-22 | End: 2024-11-21

## 2024-10-22 ASSESSMENT — PAIN SCALES - GENERAL: PAINLEVEL: MILD PAIN (2)

## 2024-10-22 NOTE — PATIENT INSTRUCTIONS
Rash    Prednisone 40 mg for the next four days, then 20 mg taper.     Hydrocortisone cream over the eyes.     Ice packs.    Zyrtec daily.  Hydroxyzine as tolerated.    Follow up with dermatology.

## 2024-10-22 NOTE — NURSING NOTE
"Chief Complaint   Patient presents with    Eye Swelling     Patient here with significant other for bilateral eye lid swelling, rash and itch that has been ongoing. She has been treated with eye drops and prednisone. She is on the last dose of prednisone taper. Rash and itch is getting worse.     Initial /67   Pulse 96   Temp 98.2  F (36.8  C) (Tympanic)   Resp 19   Ht 1.651 m (5' 5\")   Wt 75.8 kg (167 lb)   LMP 10/15/2024 (Approximate)   SpO2 100%   BMI 27.79 kg/m   Estimated body mass index is 27.79 kg/m  as calculated from the following:    Height as of this encounter: 1.651 m (5' 5\").    Weight as of this encounter: 75.8 kg (167 lb).  Medication Review: complete    The next two questions are to help us understand your food security.  If you are feeling you need any assistance in this area, we have resources available to support you today.          10/22/2024   SDOH- Food Insecurity   Within the past 12 months, did you worry that your food would run out before you got money to buy more? N   Within the past 12 months, did the food you bought just not last and you didn t have money to get more? N            Health Care Directive:  Patient does not have a Health Care Directive or Living Will: Discussed advance care planning with patient; however, patient declined at this time.    July Rubio, LPN      "

## 2024-10-23 ENCOUNTER — OFFICE VISIT (OUTPATIENT)
Dept: FAMILY MEDICINE | Facility: OTHER | Age: 19
End: 2024-10-23
Attending: PHYSICIAN ASSISTANT
Payer: COMMERCIAL

## 2024-10-23 VITALS
OXYGEN SATURATION: 99 % | DIASTOLIC BLOOD PRESSURE: 80 MMHG | RESPIRATION RATE: 15 BRPM | SYSTOLIC BLOOD PRESSURE: 128 MMHG | WEIGHT: 169.8 LBS | BODY MASS INDEX: 28.26 KG/M2 | HEART RATE: 107 BPM

## 2024-10-23 DIAGNOSIS — L30.9 PERIORBITAL DERMATITIS: Primary | ICD-10-CM

## 2024-10-23 PROCEDURE — 36415 COLL VENOUS BLD VENIPUNCTURE: CPT | Mod: ZL | Performed by: PHYSICIAN ASSISTANT

## 2024-10-23 PROCEDURE — 86364 TISS TRNSGLTMNASE EA IG CLAS: CPT | Mod: ZL | Performed by: PHYSICIAN ASSISTANT

## 2024-10-23 PROCEDURE — 99214 OFFICE O/P EST MOD 30 MIN: CPT | Performed by: PHYSICIAN ASSISTANT

## 2024-10-23 RX ORDER — TACROLIMUS 1 MG/G
OINTMENT TOPICAL 2 TIMES DAILY
Qty: 30 G | Refills: 1 | Status: SHIPPED | OUTPATIENT
Start: 2024-10-23

## 2024-10-23 ASSESSMENT — ENCOUNTER SYMPTOMS: EYE PAIN: 1

## 2024-10-23 ASSESSMENT — PAIN SCALES - GENERAL: PAINLEVEL_OUTOF10: NO PAIN (0)

## 2024-10-23 NOTE — PROGRESS NOTES
"  Assessment & Plan     Periorbital dermatitis  Patient continues to have swelling around the eyes that responds to prednisone, but returns when attempting to wean off the prednisone. Pictures of rash on chest almost resemble dermatitis herpetiformis which includes celiac disease in the differential, however patient has not noticed any changes in stool making this less likely. We will still test for celiac since she hasn't been tested before. The skin irritation seen at the corners of her eyes resembles periorbital dermatitis which could be related the the preceding rash on her chest. Discussed with patient that continued topical corticosteroid cream is not ideal for use around the eyes since it can thin the skin. Ordered tacrolimus ointment as an ulterior treatment. Other diagnoses on the differential include dermatomyositis, lupus, allergic reaction. Patient follows up with dermatologist next month.  - Tissue transglutaminase Ab IgA and IgG; Future  - tacrolimus (PROTOPIC) 0.1 % external ointment; Apply topically 2 times daily.  - Tissue transglutaminase Ab IgA and IgG    BMI  Estimated body mass index is 28.26 kg/m  as calculated from the following:    Height as of 10/22/24: 1.651 m (5' 5\").    Weight as of this encounter: 77 kg (169 lb 12.8 oz).     See Patient Instructions    No follow-ups on file.    Deonna Newman is a 19 year old, presenting for the following health issues:  Eye Problem        10/23/2024    12:37 PM   Additional Questions   Roomed by Ginny Hoyt LPN     History of Present Illness       Reason for visit:  Eye    She eats 2-3 servings of fruits and vegetables daily.She consumes 1 sweetened beverage(s) daily.She exercises with enough effort to increase her heart rate 30 to 60 minutes per day.  She exercises with enough effort to increase her heart rate 4 days per week.   She is taking medications regularly.     Patient reports for ED follow up after she had swelling around her eyes that " prohibited her from opening them. She was placed on a prednisone taper in the ED, and her symptoms were improving until yesterday when she got to the last dose of her taper and she woke up with periorbital swelling again. This all began early last month (September) with an intensely pruritic rash on her anterior neck and chest . Patient states the rash began as splotchy red patches and worsened to what looked like acne on her neck and face. Denies changes in medications, makeup products, facial cleansers, detergents, or new pets. She also notices dry skin specifically around her eyes at this time.     On October 11, patient was diagnosed with pink eye and was given eye drops. Three days later she woke up with intense swelling around her eyes, making it nearly impossible for her to open her eyes. This is when she went to the ED and they placed her on prednisone, which helped both the rash on her neck and the periorbital swelling. After waking up yesterday with more swelling, she was instructed to take another dose of the prednisone which again helped her symptoms. She still has mild periorbital edema with red, irritated skin mainly at the corners of her eyes. Has a grandmother who has lupus. Denies dry mouth, dry eyes, joint pain, loose stools. Endorses runny nose for the past month but states she normally has allergies. Has dermatology appointment scheduled for next month.      Review of Systems  Constitutional, HEENT, cardiovascular, pulmonary, GI, , musculoskeletal, neuro, skin, endocrine and psych systems are negative, except as otherwise noted.      Objective    /80 (BP Location: Right arm)   Pulse 107   Resp 15   Wt 77 kg (169 lb 12.8 oz)   LMP 10/15/2024 (Approximate)   SpO2 99%   BMI 28.26 kg/m    Body mass index is 28.26 kg/m .  Physical Exam   GENERAL: alert and no distress  EYES: Eyes grossly normal to inspection, conjunctivae and sclerae normal, and eyelids- periorbital edema each eye, no  discharge   NECK: no adenopathy, no asymmetry, masses, or scars  RESP: lungs clear to auscultation - no rales, rhonchi or wheezes  CV: regular rate and rhythm, normal S1 S2, no S3 or S4, no murmur, click or rub, no peripheral edema  ABDOMEN: soft, nontender, no hepatosplenomegaly, no masses and bowel sounds normal  MS: no gross musculoskeletal defects noted, no edema  SKIN: no suspicious lesions or rashes, multiple erythematous macules on lateral corners of eye    No results found for any visits on 10/23/24.    Signed Electronically by: Diana Serrano PA-C    I was present with student Shannon Lock, MS-3, who participated in the service and documentation of this note.  I have verified the history and personally performed the physical exam, medical decision making and have verified the content of this note, which accurately reflects my assessment of the patient and the plan of care.    Diana Serrano PA-C

## 2024-10-23 NOTE — NURSING NOTE
Patient presents with eye concerns.  Ginny Hoyt LPN.........................10/23/2024  12:38 PM

## 2024-10-24 NOTE — PROGRESS NOTES
"  Assessment & Plan     (H02.89,  H02.846,  H02.843) Pain and swelling of eyelids of both eyes  (primary encounter diagnosis)    Comment: Swelling of eyelids. Discussed that this is most likely inflammatory as prednisone is helping. Discussed further steroid, slower taper.  Opted to hold off on Kenalog injection as this would then prevent dermatology from visualizing her concern.  She does have follow up with dermatology. No evidence of oral involvement.    Plan: predniSONE (DELTASONE) 20 MG tablet,         hydrocortisone 2.5 % cream, cetirizine (ZYRTEC)        10 MG tablet          Plan to treat with increase in the prednisone again to 40 mg for the next 4 days, then drop to 20 mg.  This will allow for follow-up with dermatology next week.  Zyrtec as well.  Scant amount of hydrocortisone cream over the area.  Return to rapid clinic or ER if symptoms worsen or change in the meantime.      Deonna Newman is a 19 year old, presenting for the following health issues:  Eye Swelling    HPI    Patient presents today with concerns of persistent eyelid swelling. She notes she was started on prednisone, this cleared up the swelling. However, over the past couple of days, notes that as she has tapered down on the steroid, the swelling has again gotten worse. It is around her eyes, irritating but not painful. She has been using cool packs, nothing else. She notes that she changed to all scent-free materials.         Review of Systems  Constitutional, HEENT, cardiovascular, pulmonary, gi and gu systems are negative, except as otherwise noted.        Objective    /67   Pulse 96   Temp 98.2  F (36.8  C) (Tympanic)   Resp 19   Ht 1.651 m (5' 5\")   Wt 75.8 kg (167 lb)   LMP 10/15/2024 (Approximate)   SpO2 100%   BMI 27.79 kg/m    Body mass index is 27.79 kg/m .    Physical Exam   GENERAL: alert and no distress  EYES: Eyelids with mild edema with scant erythema bilaterally, non-tender, see clinical image, Eyes " grossly normal to inspection, PERRL and conjunctivae and sclerae normal  HENT: ear canals and TM's normal, nose and mouth without ulcers or lesions  NECK: no adenopathy, no asymmetry, masses, or scars  RESP: lungs clear to auscultation - no rales, rhonchi or wheezes  CV: regular rate and rhythm, normal S1 S2  MS: no gross musculoskeletal defects noted, no edema        Signed Electronically by: Albania Frausto PA-C

## 2024-10-25 LAB
TTG IGA SER-ACNC: 4.5 U/ML
TTG IGG SER-ACNC: <0.6 U/ML

## 2024-10-29 ENCOUNTER — TRANSFERRED RECORDS (OUTPATIENT)
Dept: HEALTH INFORMATION MANAGEMENT | Facility: OTHER | Age: 19
End: 2024-10-29
Payer: COMMERCIAL

## 2024-11-29 ENCOUNTER — MYC REFILL (OUTPATIENT)
Dept: FAMILY MEDICINE | Facility: OTHER | Age: 19
End: 2024-11-29
Payer: COMMERCIAL

## 2024-11-29 DIAGNOSIS — N92.0 MENORRHAGIA WITH REGULAR CYCLE: ICD-10-CM

## 2024-11-29 RX ORDER — DROSPIRENONE AND ETHINYL ESTRADIOL 0.02-3(28)
1 KIT ORAL DAILY
Qty: 84 TABLET | Refills: 3 | Status: CANCELLED | OUTPATIENT
Start: 2024-11-29

## 2024-12-03 DIAGNOSIS — N92.0 MENORRHAGIA WITH REGULAR CYCLE: ICD-10-CM

## 2024-12-03 NOTE — TELEPHONE ENCOUNTER
Patient comment: [facility] sent me a message that my prescription was delayed and when I looked at the julio césar, it said that I had zero refills left. I m unable to call them today but I assume that they need a new prescription.     drospirenone-ethinyl estradiol (JUAQUIN) 3-0.02 MG tablet 84 tablet 3 3/25/2024 -- No   Sig - Route: Take 1 tablet by mouth daily - Oral   Sent to pharmacy as: Drospirenone-Ethinyl Estradiol 3-0.02 MG Oral Tablet (JUAQUIN)   Class: E-Prescribe   Order: [id number]   E-Prescribing Status: Receipt confirmed by pharmacy (3/25/2024 11:56 AM CDT)     [id number] - [place], [place] - [address]     Called [facility], and spoke with staff, after verifying Pt's last name and . She states Pt picked up a 90 day supply yesterday. Shamika Renee RN .............. 12/3/2024  9:37 AM

## 2024-12-05 RX ORDER — DROSPIRENONE AND ETHINYL ESTRADIOL 0.02-3(28)
1 KIT ORAL DAILY
Qty: 84 TABLET | Refills: 3 | OUTPATIENT
Start: 2024-12-05

## 2024-12-05 NOTE — TELEPHONE ENCOUNTER
Disp Refills Start End ADDISON   drospirenone-ethinyl estradiol (JUAQUIN) 3-0.02 MG tablet 84 tablet 3 3/25/2024 -- No   Sig - Route: Take 1 tablet by mouth daily - Ora   To [facility] Luciano requesting  Refills to soon  Jaki Brady RN on 12/5/2024 at 9:50 AM

## 2024-12-29 ENCOUNTER — MYC REFILL (OUTPATIENT)
Dept: FAMILY MEDICINE | Facility: OTHER | Age: 19
End: 2024-12-29
Payer: COMMERCIAL

## 2024-12-29 DIAGNOSIS — N92.0 MENORRHAGIA WITH REGULAR CYCLE: ICD-10-CM

## 2024-12-29 RX ORDER — DROSPIRENONE AND ETHINYL ESTRADIOL 0.02-3(28)
1 KIT ORAL DAILY
Qty: 84 TABLET | Refills: 3 | Status: CANCELLED | OUTPATIENT
Start: 2024-12-29

## 2025-01-02 NOTE — TELEPHONE ENCOUNTER
Requested Prescriptions   Pending Prescriptions Disp Refills    drospirenone-ethinyl estradiol (JUAQUIN) 3-0.02 MG tablet 84 tablet 3     Sig: Take 1 tablet by mouth daily.   Last Prescription Date:   3/25/24  Last Fill Qty/Refills:         84, R-3        Next 5 appointments (look out 90 days)      Jan 14, 2025 1:00 PM  (Arrive by 12:45 PM)  Provider Visit with Diana Serraon PA-C  [facility] ([facility]) [address]  [place] [id number] [phone]     If taking as directed, Pt should not run out until 2/23. Pt has appointment 1/14. Pt an discuss refills at upcoming appointment. Pt notified via [employer]. Shamika Renee RN .............. 1/2/2025  11:00 AM

## 2025-01-16 ENCOUNTER — MYC REFILL (OUTPATIENT)
Dept: FAMILY MEDICINE | Facility: OTHER | Age: 20
End: 2025-01-16
Payer: COMMERCIAL

## 2025-01-16 DIAGNOSIS — N92.0 MENORRHAGIA WITH REGULAR CYCLE: ICD-10-CM

## 2025-01-20 RX ORDER — DROSPIRENONE AND ETHINYL ESTRADIOL 0.02-3(28)
1 KIT ORAL DAILY
Qty: 84 TABLET | Refills: 4 | Status: SHIPPED | OUTPATIENT
Start: 2025-01-20

## 2025-01-20 NOTE — TELEPHONE ENCOUNTER
Patient comment: [facility] is out of refills I like be out of town most of February and want to mske sure I dont run out     Last Prescription Date:     drospirenone-ethinyl estradiol (JUAQUIN) 3-0.02 MG tablet 84 tablet 3 3/25/2024 -- No   Sig - Route: Take 1 tablet by mouth daily - Oral   Sent to pharmacy as: Drospirenone-Ethinyl Estradiol 3-0.02 MG Oral Tablet (JUAQUIN)   Class: E-Prescribe   Order: [id number]   E-Prescribing Status: Receipt confirmed by pharmacy (3/25/2024 11:56 AM CDT)     [id number] - [place], [place] - [address]     Last Office Visit:                10/23/24   3/25/24 (Px)    Future Office visit:      None    Unable to complete prescription refill per RN Medication Refill Policy. Shamika Renee RN .............. 1/20/2025  10:45 AM

## 2025-02-11 ENCOUNTER — OFFICE VISIT (OUTPATIENT)
Dept: FAMILY MEDICINE | Facility: OTHER | Age: 20
End: 2025-02-11
Attending: STUDENT IN AN ORGANIZED HEALTH CARE EDUCATION/TRAINING PROGRAM
Payer: COMMERCIAL

## 2025-02-11 ENCOUNTER — HOSPITAL ENCOUNTER (OUTPATIENT)
Dept: GENERAL RADIOLOGY | Facility: OTHER | Age: 20
Discharge: HOME | End: 2025-02-11
Attending: NURSE PRACTITIONER
Payer: COMMERCIAL

## 2025-02-11 VITALS
WEIGHT: 171 LBS | OXYGEN SATURATION: 100 % | BODY MASS INDEX: 28.49 KG/M2 | SYSTOLIC BLOOD PRESSURE: 126 MMHG | TEMPERATURE: 99.5 F | DIASTOLIC BLOOD PRESSURE: 68 MMHG | RESPIRATION RATE: 21 BRPM | HEART RATE: 118 BPM | HEIGHT: 65 IN

## 2025-02-11 DIAGNOSIS — J10.1 INFLUENZA A: ICD-10-CM

## 2025-02-11 DIAGNOSIS — R05.1 ACUTE COUGH: Primary | ICD-10-CM

## 2025-02-11 DIAGNOSIS — R50.9 FEVER IN ADULT: ICD-10-CM

## 2025-02-11 DIAGNOSIS — R09.81 NASAL CONGESTION: ICD-10-CM

## 2025-02-11 LAB
FLUAV RNA SPEC QL NAA+PROBE: POSITIVE
FLUBV RNA RESP QL NAA+PROBE: NEGATIVE
RSV RNA SPEC NAA+PROBE: NEGATIVE
S PYO DNA THROAT QL NAA+PROBE: NOT DETECTED
SARS-COV-2 RNA RESP QL NAA+PROBE: NEGATIVE

## 2025-02-11 PROCEDURE — 87651 STREP A DNA AMP PROBE: CPT | Mod: ZL | Performed by: NURSE PRACTITIONER

## 2025-02-11 PROCEDURE — 71046 X-RAY EXAM CHEST 2 VIEWS: CPT

## 2025-02-11 PROCEDURE — 87637 SARSCOV2&INF A&B&RSV AMP PRB: CPT | Mod: ZL | Performed by: NURSE PRACTITIONER

## 2025-02-11 RX ORDER — OSELTAMIVIR PHOSPHATE 75 MG/1
75 CAPSULE ORAL 2 TIMES DAILY
Qty: 10 CAPSULE | Refills: 0 | Status: SHIPPED | OUTPATIENT
Start: 2025-02-11 | End: 2025-02-16

## 2025-02-11 ASSESSMENT — ENCOUNTER SYMPTOMS
PSYCHIATRIC NEGATIVE: 1
MUSCULOSKELETAL NEGATIVE: 1
SHORTNESS OF BREATH: 1
CARDIOVASCULAR NEGATIVE: 1
NEUROLOGICAL NEGATIVE: 1
EYES NEGATIVE: 1
SORE THROAT: 1
ENDOCRINE NEGATIVE: 1
GASTROINTESTINAL NEGATIVE: 1
COUGH: 1
ALLERGIC/IMMUNOLOGIC NEGATIVE: 1
ACTIVITY CHANGE: 1
FEVER: 1
APPETITE CHANGE: 1

## 2025-02-11 ASSESSMENT — PAIN SCALES - GENERAL: PAINLEVEL_OUTOF10: MODERATE PAIN (6)

## 2025-02-11 NOTE — NURSING NOTE
"Chief Complaint   Patient presents with    Pharyngitis    Fever    Nasal Congestion    Shortness of Breath     Patient here for sore throat, fever, and runny nose x2.5 weeks. Has been treating with tylenol and ibuprofen. Also notes breathing feels like breathing through a straw.     Initial /68   Pulse 118   Temp 99.5  F (37.5  C) (Tympanic)   Resp 21   Ht 1.651 m (5' 5\")   Wt 77.6 kg (171 lb)   LMP 02/01/2025   SpO2 100%   BMI 28.46 kg/m   Estimated body mass index is 28.46 kg/m  as calculated from the following:    Height as of this encounter: 1.651 m (5' 5\").    Weight as of this encounter: 77.6 kg (171 lb).  Medication Review: complete    The next two questions are to help us understand your food security.  If you are feeling you need any assistance in this area, we have resources available to support you today.          2/11/2025   SDOH- Food Insecurity   Within the past 12 months, did you worry that your food would run out before you got money to buy more? N   Within the past 12 months, did the food you bought just not last and you didn t have money to get more? N         Health Care Directive:  Patient does not have a Health Care Directive: Discussed advance care planning with patient; however, patient declined at this time.    July Rubio LPN      "

## 2025-02-11 NOTE — PROGRESS NOTES
Paty Maurer  2005    ASSESSMENT/PLAN      Presents to rapid clinic with cough, congestion, fever, sore throat and increased shortness of breath over the last 2 weeks.  Patient has an acute illness with signs of systemic infection including fever.  Patient states she had about 2 weeks of symptoms and then felt much better for 2 to 3 days.  Patient developed new onset of fever, congestion and sore throat 2 days ago.  Given that patient did have 2 weeks of symptoms prior to new onset 2 days ago will obtain chest x-ray.  Chest x-ray with no abnormalities, no pneumonia or other acute change.  Strep testing negative.   COVID, influenza, RSV positive for influenza A.  Patient's vitals are stable and she appears nontoxic.        1. Acute cough (Primary)  2. Fever in adult  3. Nasal congestion      - XR Chest 2 Views  - Influenza A/B, RSV and SARS-CoV2 PCR (COVID-19) Nose  - Group A Streptococcus PCR Throat Swab       4. Influenza A    - oseltamivir (TAMIFLU) 75 MG capsule; Take 1 capsule (75 mg) by mouth 2 times daily for 5 days.  Dispense: 10 capsule; Refill: 0   - Discussed with patient that symptoms and exam are consistent with viral illness.    - No clinical indications for antibiotic treatment at this time.  - Symptomatic treatment - Encouraged fluids, salt water gargles, honey, humidifier, saline nasal spray, lozenges, tea, soup, smoothies, popsicles, topical vapor rub, rest, etc   - May use over-the-counter Tylenol or ibuprofen PRN  - Follow up as needed for new or worsening symptoms          *Explanation of diagnosis, treatment options and risk and benefits of medications reviewed with patient. Patient agrees with plan of care.  *All questions were answered.    *Red flags symptoms were discussed and patient was advised when they should return for reevaluation or for prompt emergency evaluation.   *Patient was given verbal and written instructions on plan of care. Instructions were printed or are available on  "Mychart on electronic AVS.   *We discussed potential side effects of any prescribed or recommended therapies, as well as expectations for response to treatments.  *Patient discharged in stable condition    Margarita Tolbert CNP  [facility]    SUBJECTIVE  CHIEF COMPLAINT/ REASON FOR VISIT  Patient presents with:  Pharyngitis  Fever  Nasal Congestion  Shortness of Breath     HISTORY OF PRESENT ILLNESS  Paty Maurer is a pleasant 19 year old female presents to rapid clinic today with cough, congestion, fever, sore throat and increased shortness of breath over the last 2 weeks.  Patient states she had about 2 weeks of symptoms and then felt much better for 2 to 3 days.  Patient developed new onset of fever, congestion and sore throat 2 days ago.    I have reviewed the nursing notes.  I have reviewed allergies, medication list, problem list, and past medical history.    REVIEW OF SYSTEMS  Review of Systems   Constitutional:  Positive for activity change, appetite change and fever.   HENT:  Positive for congestion and sore throat.    Eyes: Negative.    Respiratory:  Positive for cough and shortness of breath.    Cardiovascular: Negative.    Gastrointestinal: Negative.    Endocrine: Negative.    Genitourinary: Negative.    Musculoskeletal: Negative.    Allergic/Immunologic: Negative.    Neurological: Negative.    Psychiatric/Behavioral: Negative.     All other systems reviewed and are negative.       VITAL SIGNS  Vitals:    02/11/25 0946   BP: 126/68   Pulse: 118   Resp: 21   Temp: 99.5  F (37.5  C)   TempSrc: Tympanic   SpO2: 100%   Weight: 77.6 kg (171 lb)   Height: 1.651 m (5' 5\")      Body mass index is 28.46 kg/m .      OBJECTIVE  PHYSICAL EXAM  Physical Exam  Vitals and nursing note reviewed.   Constitutional:       Appearance: Normal appearance.   HENT:      Head: Normocephalic.      Right Ear: Tympanic membrane normal.      Left Ear: Tympanic membrane normal.      Nose: Congestion present.      " Mouth/Throat:      Mouth: Mucous membranes are moist.      Pharynx: Posterior oropharyngeal erythema present.   Eyes:      Pupils: Pupils are equal, round, and reactive to light.   Cardiovascular:      Rate and Rhythm: Normal rate.      Pulses: Normal pulses.      Heart sounds: Normal heart sounds.   Pulmonary:      Effort: Pulmonary effort is normal.      Breath sounds: Wheezing present.   Abdominal:      General: Bowel sounds are normal.      Palpations: Abdomen is soft.   Musculoskeletal:         General: Normal range of motion.      Cervical back: Normal range of motion.   Skin:     General: Skin is warm and dry.      Capillary Refill: Capillary refill takes less than 2 seconds.   Neurological:      General: No focal deficit present.      Mental Status: She is alert.            DIAGNOSTICS  Results for orders placed or performed in visit on 02/11/25   XR Chest 2 Views     Status: None    Narrative    PROCEDURE:  XR CHEST 2 VIEWS    HISTORY: Acute cough; Fever in adult; Nasal congestion    COMPARISON: Chest radiographs 3/7/2014    FINDINGS: PA and lateral chest radiographs    Cardiomediastinal silhouette is within normal limits.  No focal consolidation, effusion or pneumothorax.    No suspicious osseous lesion or subdiaphragmatic free air.      Impression    IMPRESSION:    No acute findings.    MICHELA MURPHY MD         SYSTEM ID:  [id number]   Influenza A/B, RSV and SARS-CoV2 PCR (COVID-19) Nose     Status: Abnormal    Specimen: Nose; Swab   Result Value Ref Range    Influenza A PCR Positive (A) Negative    Influenza B PCR Negative Negative    RSV PCR Negative Negative    SARS CoV2 PCR Negative Negative    Narrative    Testing was performed using the Xpert Xpress CoV2/Flu/RSV Assay on the [employer] GeneXpert Instrument. This test should be ordered for the detection of SARS-CoV2, influenza, and RSV viruses in individuals with signs and symptoms of respiratory tract infection. This test is for in vitro diagnostic use  under the [place] FDA for laboratories certified under CLIA to perform high or moderate complexity testing. This test has been [place] FDA cleared. A negative result does not rule out the presence of PCR inhibitors in the specimen or target RNA in concentration below the limit of detection for the assay. If only one viral target is positive but coinfection with multiple targets is suspected, the sample should be re-tested with another FDA cleared, approved, or authorized test, if coninfection would change clinical management. This test was validated by the [facility] [employer]. These laboratories are certified under the Clinical Laboratory Improvement Amendments of 1988 (CLIA-88) as qualified to perfom high complexity laboratory testing.   Group A Streptococcus PCR Throat Swab     Status: Normal    Specimen: Throat; Swab   Result Value Ref Range    Group A strep by PCR Not Detected Not Detected    Narrative    The Xpert Xpress Strep A test, performed on the [employer], is a rapid, qualitative in vitro diagnostic test for the detection of Streptococcus pyogenes (Group A ß-hemolytic Streptococcus, Strep A) in throat swab specimens from patients with signs and symptoms of pharyngitis. The Xpert Xpress Strep A test can be used as an aid in the diagnosis of Group A Streptococcal pharyngitis. The assay is not intended to monitor treatment for Group A Streptococcus infections. The Xpert Xpress Strep A test utilizes an automated real-time polymerase chain reaction (PCR) to detect Streptococcus pyogenes DNA.

## 2025-03-18 SDOH — HEALTH STABILITY: PHYSICAL HEALTH: ON AVERAGE, HOW MANY DAYS PER WEEK DO YOU ENGAGE IN MODERATE TO STRENUOUS EXERCISE (LIKE A BRISK WALK)?: 4 DAYS

## 2025-03-18 SDOH — HEALTH STABILITY: PHYSICAL HEALTH: ON AVERAGE, HOW MANY MINUTES DO YOU ENGAGE IN EXERCISE AT THIS LEVEL?: 40 MIN

## 2025-03-18 ASSESSMENT — SOCIAL DETERMINANTS OF HEALTH (SDOH): HOW OFTEN DO YOU GET TOGETHER WITH FRIENDS OR RELATIVES?: THREE TIMES A WEEK

## 2025-03-25 ENCOUNTER — MYC MEDICAL ADVICE (OUTPATIENT)
Dept: FAMILY MEDICINE | Facility: OTHER | Age: 20
End: 2025-03-25

## 2025-03-25 ENCOUNTER — OFFICE VISIT (OUTPATIENT)
Dept: FAMILY MEDICINE | Facility: OTHER | Age: 20
End: 2025-03-25
Attending: FAMILY MEDICINE
Payer: COMMERCIAL

## 2025-03-25 VITALS
SYSTOLIC BLOOD PRESSURE: 108 MMHG | DIASTOLIC BLOOD PRESSURE: 64 MMHG | WEIGHT: 178 LBS | RESPIRATION RATE: 18 BRPM | HEIGHT: 65 IN | BODY MASS INDEX: 29.66 KG/M2 | HEART RATE: 88 BPM | TEMPERATURE: 98.2 F | OXYGEN SATURATION: 99 %

## 2025-03-25 DIAGNOSIS — Z00.00 ROUTINE GENERAL MEDICAL EXAMINATION AT A HEALTH CARE FACILITY: ICD-10-CM

## 2025-03-25 DIAGNOSIS — N89.8 VAGINAL ITCHING: ICD-10-CM

## 2025-03-25 DIAGNOSIS — N92.0 MENORRHAGIA WITH REGULAR CYCLE: ICD-10-CM

## 2025-03-25 DIAGNOSIS — J30.89 ALLERGIC RHINITIS DUE TO OTHER ALLERGIC TRIGGER, UNSPECIFIED SEASONALITY: Primary | ICD-10-CM

## 2025-03-25 LAB
BACTERIAL VAGINOSIS VAG-IMP: NEGATIVE
BASOPHILS # BLD AUTO: 0.1 10E3/UL (ref 0–0.2)
BASOPHILS NFR BLD AUTO: 1 %
CANDIDA DNA VAG QL NAA+PROBE: NOT DETECTED
CANDIDA GLABRATA / CANDIDA KRUSEI DNA: NOT DETECTED
EOSINOPHIL # BLD AUTO: 0.2 10E3/UL (ref 0–0.7)
EOSINOPHIL NFR BLD AUTO: 2 %
ERYTHROCYTE [DISTWIDTH] IN BLOOD BY AUTOMATED COUNT: 12.8 % (ref 10–15)
HCT VFR BLD AUTO: 38.6 % (ref 35–47)
HGB BLD-MCNC: 13.5 G/DL (ref 11.7–15.7)
IMM GRANULOCYTES # BLD: 0 10E3/UL
IMM GRANULOCYTES NFR BLD: 0 %
LYMPHOCYTES # BLD AUTO: 3.1 10E3/UL (ref 0.8–5.3)
LYMPHOCYTES NFR BLD AUTO: 38 %
MCH RBC QN AUTO: 30.9 PG (ref 26.5–33)
MCHC RBC AUTO-ENTMCNC: 35 G/DL (ref 31.5–36.5)
MCV RBC AUTO: 88 FL (ref 78–100)
MONOCYTES # BLD AUTO: 0.6 10E3/UL (ref 0–1.3)
MONOCYTES NFR BLD AUTO: 7 %
NEUTROPHILS # BLD AUTO: 4.3 10E3/UL (ref 1.6–8.3)
NEUTROPHILS NFR BLD AUTO: 52 %
NRBC # BLD AUTO: 0 10E3/UL
NRBC BLD AUTO-RTO: 0 /100
PLATELET # BLD AUTO: 335 10E3/UL (ref 150–450)
RBC # BLD AUTO: 4.37 10E6/UL (ref 3.8–5.2)
T VAGINALIS DNA VAG QL NAA+PROBE: NOT DETECTED
WBC # BLD AUTO: 8.3 10E3/UL (ref 4–11)

## 2025-03-25 PROCEDURE — 85004 AUTOMATED DIFF WBC COUNT: CPT | Mod: ZL | Performed by: FAMILY MEDICINE

## 2025-03-25 PROCEDURE — 81515 NFCT DS BV&VAGINITIS DNA ALG: CPT | Mod: ZL | Performed by: FAMILY MEDICINE

## 2025-03-25 PROCEDURE — 36415 COLL VENOUS BLD VENIPUNCTURE: CPT | Mod: ZL | Performed by: FAMILY MEDICINE

## 2025-03-25 RX ORDER — FLUCONAZOLE 150 MG/1
150 TABLET ORAL
Qty: 3 TABLET | Refills: 0 | Status: SHIPPED | OUTPATIENT
Start: 2025-03-25 | End: 2025-04-01

## 2025-03-25 RX ORDER — DROSPIRENONE AND ETHINYL ESTRADIOL 0.02-3(28)
1 KIT ORAL DAILY
Qty: 84 TABLET | Refills: 4 | Status: SHIPPED | OUTPATIENT
Start: 2025-03-25

## 2025-03-25 RX ORDER — CETIRIZINE HYDROCHLORIDE 10 MG/1
10 TABLET ORAL
Qty: 180 TABLET | Refills: 4 | Status: SHIPPED | OUTPATIENT
Start: 2025-03-25

## 2025-03-25 ASSESSMENT — PAIN SCALES - GENERAL: PAINLEVEL_OUTOF10: NO PAIN (0)

## 2025-03-25 NOTE — NURSING NOTE
Patient here for annual physical and medication refills. She does have concerns with vaginal itching after completing an antibiotic for a UTI. Monistat OTC did not help. Medication Reconciliation: complete.    Florina Duval LPN  3/25/2025 12:50 PM

## 2025-03-25 NOTE — TELEPHONE ENCOUNTER
Forgot to mention the following at OV:  frequent urination, weight gain, acne, possible food-related reaction.      Would it be possible to order labs for:                     Hormone panel (testosterone, DHEA-S, LH, FSH)                     Thyroid panel (TSH, Free T4, Free T3)                     Fasting insulin, glucose, A1C                     Urinalysis    I'm assuming she would need an OV to discuss further/further lab testing?   Or do you want me to liliam up requested labs?     Argentina Soto RN on 3/25/2025 at 1:59 PM

## 2025-03-26 NOTE — PROGRESS NOTES
SUBJECTIVE:   Paty Maurer is a 19 year old female who presents to clinic today for the following health issues: Patient arrives here for medication refill.    History of Present Illness       Reason for visit:  Yearly check up   She is taking medications regularly.    She also reports that she has been having some vaginal itching.  States it feels like a yeast infection.  She did use Monistat without any improvement.    Patient Active Problem List    Diagnosis Date Noted    Menorrhagia with regular cycle 12/03/2023     Priority: Medium    Acne vulgaris 12/03/2023     Priority: Medium    Migraine without aura and with status migrainosus, not intractable 12/03/2023     Priority: Medium    Adjustment disorder with mixed anxiety and depressed mood 08/13/2018     Priority: Medium     Francie Azul recommends a good therapist.  If she needs medications, she should start with the usual ssri's recommended for adolescents.  Signed by Sowmya Hickman MD .....8/31/2020 9:18 AM'      Allergic rhinitis 06/18/2012     Priority: Medium    Dermatitis, atopic 03/21/2006     Priority: Medium     Past Medical History:   Diagnosis Date    Accessory nipple     No Comments Provided    Headache     2009,thought to be secondary to environmental allergies    Otitis media     2005    Otitis media     2005,left    Otitis media of both ears     2005,, treated with a 10-day course of Amoxicillin.    Personal history of other diseases of the female genital tract     Born 39 weeks induced vaginal delivery, birth wt. 7 lbs. ? oz. 20 ? in. long, mother on prednisone during pregnancy due to anti-lupus antibodies present      Past Surgical History:   Procedure Laterality Date    OTHER SURGICAL HISTORY      66464.0,PAST SURGICAL HISTORY,Born 39 weeks induced vaginal delivery, birth wt. 7 lbs. ? oz. 20 ? in. long, mother on prednisone during pregnancy due to anti-lupus antibodies present ~Supernumerary nipple.  ~2/07/05 Bilateral otitis media, treated  "with a 10-day course of Amoxicillin.*       Review of Systems     OBJECTIVE:     /64   Pulse 88   Temp 98.2  F (36.8  C)   Resp 18   Ht 1.651 m (5' 5\")   Wt 80.7 kg (178 lb)   LMP 03/01/2025   SpO2 99%   BMI 29.62 kg/m    Body mass index is 29.62 kg/m .  Physical Exam  Constitutional:       Appearance: Normal appearance.   HENT:      Head: Normocephalic and atraumatic.      Right Ear: Tympanic membrane normal.      Left Ear: Tympanic membrane normal.   Cardiovascular:      Rate and Rhythm: Normal rate and regular rhythm.   Pulmonary:      Effort: Pulmonary effort is normal.      Breath sounds: Normal breath sounds.   Neurological:      Mental Status: She is alert.   Psychiatric:         Mood and Affect: Mood normal.         Thought Content: Thought content normal.         Diagnostic Test Results:  Results for orders placed or performed in visit on 03/25/25 (from the past 24 hours)   Multiplex Vaginal Panel by PCR    Specimen: Vagina; Swab   Result Value Ref Range    Bacterial Vaginosis Organism DNA Negative Negative    Candida Group DNA Not Detected Not Detected    Candida glabrata / Kamryn krusei DNA Not Detected Not Detected    Trichomonas vaginalis DNA Not Detected Not Detected    Narrative    The Xpert  Xpress MVP test, performed on the [employer] Systems, is an automated, qualitative in vitro diagnostic test for the detection of DNA targets from anaerobic bacteria associated with bacterial vaginosis, Candida species associated with vulvovaginal candidiasis, and Trichomonas vaginalis. The assay uses clinician-collected and self-collected vaginal swabs from patients who are symptomatic for vaginitis/ vaginosis. The Xpert  Xpress MVP test utilizes real-time polymerase chain reaction (PCR) for the amplification of specific DNA targets and utilizes fluorogenic target-specific hybridization probes to detect and differentiate DNA. It is intended to aid in the diagnosis of vaginal infections " in women with a clinical presentation consistent with bacterial vaginosis, vulvovaginal candidiasis, or trichomoniasis.   The assay targets three anaerobic microorgansims that are associated with bacterial vaginosis (BV). Other organisms that are not detected by the Xpert  Xpress MVP test have also been reported to be associated with BV. The BV organism and Candida species targets of the Xpert  Xpress MVP test can be commensal in women; positive results must be considered in conjunction with other clinical and patient information to determine the disease status.   CBC and Differential    Narrative    The following orders were created for panel order CBC and Differential.  Procedure                               Abnormality         Status                     ---------                               -----------         ------                     CBC with platelets and ...[[id number]]                      Final result                 Please view results for these tests on the individual orders.   CBC with platelets and differential   Result Value Ref Range    WBC Count 8.3 4.0 - 11.0 10e3/uL    RBC Count 4.37 3.80 - 5.20 10e6/uL    Hemoglobin 13.5 11.7 - 15.7 g/dL    Hematocrit 38.6 35.0 - 47.0 %    MCV 88 78 - 100 fL    MCH 30.9 26.5 - 33.0 pg    MCHC 35.0 31.5 - 36.5 g/dL    RDW 12.8 10.0 - 15.0 %    Platelet Count 335 150 - 450 10e3/uL    % Neutrophils 52 %    % Lymphocytes 38 %    % Monocytes 7 %    % Eosinophils 2 %    % Basophils 1 %    % Immature Granulocytes 0 %    NRBCs per 100 WBC 0 <1 /100    Absolute Neutrophils 4.3 1.6 - 8.3 10e3/uL    Absolute Lymphocytes 3.1 0.8 - 5.3 10e3/uL    Absolute Monocytes 0.6 0.0 - 1.3 10e3/uL    Absolute Eosinophils 0.2 0.0 - 0.7 10e3/uL    Absolute Basophils 0.1 0.0 - 0.2 10e3/uL    Absolute Immature Granulocytes 0.0 <=0.4 10e3/uL    Absolute NRBCs 0.0 10e3/uL       ASSESSMENT/PLAN:           ICD-10-CM    1. Allergic rhinitis due to other allergic trigger, unspecified  seasonality  J30.89 cetirizine (ZYRTEC) 10 MG tablet      2. Routine general medical examination at a health care facility  Z00.00       3. Menorrhagia with regular cycle  N92.0 drospirenone-ethinyl estradiol (JUAQUIN) 3-0.02 MG tablet     CBC and Differential     CBC and Differential      4. Vaginal itching  N89.8 fluconazole (DIFLUCAN) 150 MG tablet     GC/Chlamydia by PCR     Multiplex Vaginal Panel by PCR     Multiplex Vaginal Panel by PCR        Medication refill  She is given fluconazole x 3 doses for vaginal itching.  Discussed checking for chlamydia GC patient declined.    Patient advised Pap smear starting next year  Fabio Martin MD  [facility]

## 2025-03-27 NOTE — TELEPHONE ENCOUNTER
Patient returned call and an appointment was made with Dr. Martin for 4.10.2025.  Lisa Tovar on 3/27/2025 at 10:54 AM

## 2025-07-15 ENCOUNTER — OFFICE VISIT (OUTPATIENT)
Dept: PEDIATRICS | Facility: OTHER | Age: 20
End: 2025-07-15
Attending: INTERNAL MEDICINE
Payer: COMMERCIAL

## 2025-07-15 VITALS
SYSTOLIC BLOOD PRESSURE: 116 MMHG | DIASTOLIC BLOOD PRESSURE: 80 MMHG | BODY MASS INDEX: 28.29 KG/M2 | TEMPERATURE: 99 F | RESPIRATION RATE: 16 BRPM | HEART RATE: 96 BPM | WEIGHT: 170 LBS | OXYGEN SATURATION: 99 %

## 2025-07-15 RX ORDER — DEXTROAMPHETAMINE SACCHARATE, AMPHETAMINE ASPARTATE MONOHYDRATE, DEXTROAMPHETAMINE SULFATE AND AMPHETAMINE SULFATE 7.5; 7.5; 7.5; 7.5 MG/1; MG/1; MG/1; MG/1
30 CAPSULE, EXTENDED RELEASE ORAL EVERY MORNING
COMMUNITY
Start: 2025-07-05

## 2025-07-15 ASSESSMENT — PAIN SCALES - GENERAL: PAINLEVEL_OUTOF10: NO PAIN (0)

## 2025-07-15 NOTE — PROGRESS NOTES
Assessment & Plan     1. Pre-school health examination (Primary)  She needed a form signed to say that she is healthy to do the physical training required for her schooling.  She is going to be a .  She reports that she is able to do significantly active activities including running and working out at the gym without chest pains, heart palpitations or dyspnea.  I completed her form today.    Return if symptoms worsen or fail to improve.    Signed, Moustapha Zuniga MD, FAAP, FACP  Internal Medicine & Pediatrics    Subjective   Paty Maurer is a 20 year old female who presents for Forms ([facility]  Training).    Objective   Vitals: /80   Pulse 96   Temp 99  F (37.2  C) (Tympanic)   Resp 16   Wt 77.1 kg (170 lb)   LMP 06/20/2025 (Exact Date)   SpO2 99%   Breastfeeding No   BMI 28.29 kg/m      CV: RRR no m/r/g  Pulm: CTAB

## 2025-07-15 NOTE — NURSING NOTE
"Chief Complaint   Patient presents with    Forms     [facility]  Training       Initial /80   Pulse 96   Temp 99  F (37.2  C) (Tympanic)   Resp 16   Wt 77.1 kg (170 lb)   LMP 06/20/2025 (Exact Date)   SpO2 99%   Breastfeeding No   BMI 28.29 kg/m   Estimated body mass index is 28.29 kg/m  as calculated from the following:    Height as of 3/25/25: 1.651 m (5' 5\").    Weight as of this encounter: 77.1 kg (170 lb).  Medication Review: complete    The next two questions are to help us understand your food security.  If you are feeling you need any assistance in this area, we have resources available to support you today.          3/18/2025   SDOH- Food Insecurity   Within the past 12 months, did you worry that your food would run out before you got money to buy more? N   Within the past 12 months, did the food you bought just not last and you didn t have money to get more? N         Health Care Directive:  Patient does not have a Health Care Directive: Discussed advance care planning with patient; however, patient declined at this time.    Norma J. Gosselin, LPN      "

## (undated) RX ORDER — ONDANSETRON 4 MG/1
TABLET, ORALLY DISINTEGRATING ORAL
Status: DISPENSED
Start: 2024-04-20

## (undated) RX ORDER — LIDOCAINE HYDROCHLORIDE 10 MG/ML
INJECTION, SOLUTION EPIDURAL; INFILTRATION; INTRACAUDAL; PERINEURAL
Status: DISPENSED
Start: 2022-08-17

## (undated) RX ORDER — KETOROLAC TROMETHAMINE 30 MG/ML
INJECTION, SOLUTION INTRAMUSCULAR; INTRAVENOUS
Status: DISPENSED
Start: 2024-04-20